# Patient Record
Sex: MALE
[De-identification: names, ages, dates, MRNs, and addresses within clinical notes are randomized per-mention and may not be internally consistent; named-entity substitution may affect disease eponyms.]

---

## 2022-10-29 ENCOUNTER — NURSE TRIAGE (OUTPATIENT)
Dept: OTHER | Facility: CLINIC | Age: 27
End: 2022-10-29

## 2022-10-29 NOTE — TELEPHONE ENCOUNTER
Subjective: Caller states \"I have not used any of the services for medical mutual and I was wondering if there is a good place near by to inquire about something. For about a week now I have a persistent cough. \"     Current Symptoms: cough    Onset: 1 week ago; worsening    Associated Symptoms: Cough is keeping up patient. Coughing fits per patient- will feel SOB when happening. Pain Severity: 0/10; N/A; none    Temperature: Low grade temperature last week- no temperature since Tuesday. What has been tried: cough, robitussin     Recommended disposition: See PCP within 24 Hours    Care advice provided, patient verbalizes understanding; denies any other questions or concerns; instructed to call back for any new or worsening symptoms. Patient agrees to go to [facility] near them for evaluation of symptoms. Looked up C's for patient. Patient given numbers and addresses for [facility] near them. This triage is a result of a call to [address] [place] [address]. Please do not respond to the triage nurse through this encounter. Any subsequent communication should be directly with the patient.     Reason for Disposition   SEVERE coughing spells (e.g., whooping sound after coughing, vomiting after coughing)    Protocols used: Cough - Acute Productive-ADULT-